# Patient Record
(demographics unavailable — no encounter records)

---

## 2017-02-17 NOTE — ER DOCUMENT REPORT
ED ENT





- General


Chief Complaint: Flu Symptoms


Stated Complaint: SORE THROAT/COUGH/FEVER


Time seen by provider: 01:49


Mode of Arrival: Ambulatory


Information source: Patient, Parent


TRAVEL OUTSIDE OF THE [place] IN LAST 30 DAYS: No





- HPI


Patient complains to provider of: Throat problem


Onset: Yesterday


Onset/Duration: Gradual


Quality of pain: Achy


Severity: Moderate


Pain Level: 3


Location of pain: Throat


Associated symptoms: Fever, Headache


Notes: 


Patient is an 11-year-old male brought to the emergency room by mother for 

complaints of fever with sore throat and runny nose 2 days, 2 episodes of 

vomiting today but that was associated with a migraine headache which patient 

frequently gets, his last dose of Motrin was at 3 PM, mother reports multiple 

sick contacts at school recently





- Related Data


Allergies/Adverse Reactions: 


 





No Known Allergies Allergy (Verified 11/03/12 18:30)


 











Past Medical History





- General


Information source: Patient, Parent





- Social History


Smoking Status: Never Smoker


Chew tobacco use (# tins/day): No


Frequency of alcohol use: None


Drug Abuse: None


Family History: Reviewed & Not Pertinent


Patient has suicidal ideation: No


Patient has homicidal ideation: No


Renal/ Medical History: Denies: Hx Peritoneal Dialysis





- Immunizations


Immunizations up to date: Yes





Review of Systems





- Review of Systems


Constitutional: Fever


EENT: See HPI


Cardiovascular: No symptoms reported


Respiratory: No symptoms reported


Gastrointestinal: See HPI


Genitourinary: No symptoms reported


Male Genitourinary: No symptoms reported


Musculoskeletal: No symptoms reported


Skin: No symptoms reported


Hematologic/Lymphatic: No symptoms reported


Neurological/Psychological: Headaches


-: Yes All other systems reviewed and negative





Physical Exam





- Vital signs


Vitals: 


 











Temp Pulse Resp BP Pulse Ox


 


 98.6 F   102 H  18   117/82   98 


 


 02/16/17 21:49  02/16/17 21:49  02/16/17 21:49  02/16/17 21:49  02/16/17 21:49











Interpretation: Tachycardic, Febrile





- General


General appearance: Appears well, Alert





- HEENT


Head: Normocephalic, Atraumatic


Eyes: Normal


Conjunctiva: Normal


Extraocular movements intact: Yes


Eyelashes: Normal


Pupils: PERRL


Ears: Normal


External canal: Normal


Tympanic membrane: Normal


Sinus: Normal


Nasal: Normal


Mouth/Lips: Normal


Pharynx: Erythema, Exudate, Tonsillar hypertrophy


Neck: Normal





- Respiratory


Respiratory status: No respiratory distress


Chest status: Nontender


Breath sounds: Normal


Chest palpation: Normal





- Cardiovascular


Rhythm: Regular


Heart sounds: Normal auscultation


Murmur: No





- Abdominal


Inspection: Normal


Distension: No distension


Bowel sounds: Normal


Tenderness: Nontender


Organomegaly: No organomegaly





- Back


Back: Normal, Nontender





- Extremities


General upper extremity: Normal inspection, Nontender, Normal color, Normal ROM

, Normal temperature


General lower extremity: Normal inspection, Nontender, Normal color, Normal ROM

, Normal temperature, Normal weight bearing.  No: Liza's sign





- Neurological


Neuro grossly intact: Yes


Cognition: Normal


Orientation: AAOx4


Lexie Coma Scale Eye Opening: Spontaneous


Lexie Coma Scale Verbal: Oriented


Lexie Coma Scale Motor: Obeys Commands


[place] Coma Scale Total: 15


Speech: Normal


Motor strength normal: LUE, RUE, LLE, RLE


Sensory: Normal





- Psychological


Associated symptoms: Normal affect, Normal mood





- Skin


Skin Temperature: Warm


Skin Moisture: Dry


Skin Color: Normal





Course





- Re-evaluation


Re-evalutation: 





02/17/17 04:04


Physical exam findings are consistent with strep pharyngitis with erythema, 

tonsillar hypertrophy and exudates, although patient's rapid strep test was 

negative, he was provided with a dose of IM Bicillin as well as antipyretics, 

mother was advised to follow-up with the pediatrician, provide supportive care, 

return if symptoms worsen, patient's mother acknowledges understanding and 

agreement with this plan





- Vital Signs


Vital signs: 


 











Temp Pulse Resp BP Pulse Ox


 


 100.3 F H  129 H  14 L  130/80   98 


 


 02/17/17 02:50  02/17/17 02:50  02/17/17 02:50  02/17/17 02:50  02/17/17 02:50














Discharge





- Discharge


Clinical Impression: 


 Strep pharyngitis





Condition: Stable


Disposition: HOME, SELF-CARE


Instructions:  Acetaminophen, Strep Throat ([facility]), Pediatric Ibuprofen ([facility])


Additional Instructions: 


Encourage plenty of fluids.  Tylenol or Motrin as needed for fever.  Follow-up 

with your pediatrician in one to 2 days.  Return to the emergency room 

immediately if symptoms worsen or any additional concerns.


Forms:  Return to School


Referrals: 


FAREED RANDLE MD [Primary Care Provider] - Follow up as needed

## 2017-12-02 NOTE — ER DOCUMENT REPORT
HPI





- HPI


Pain Level: 5


Notes: 





Patient is a 12-year-old male no significant past medical history aside from 

migraines who presents the ED with mother complaining of hitting his head off 

the ground while playing football couple hours ago.  Patient states that he was 

tackled and hit his head off the ground, but was wearing his helmet.  Patient 

did not have any loss of consciousness, nausea/vomiting.  Patient currently has 

a lingering headache and pain behind his eyes bilaterally.  Patient does not 

have any pain with movement of his eyes or swelling or redness.  He still 

eating and drinking without any difficulties.  He is urinating normally.  

Mother has not noticed any behavioral changes.  Denies any drug allergies.  

Patient is ambulatory without any difficulties.  Denies any fever, neck pain, 

changes in vision/speech/mentation/hearing, URI, sore throat, chest pain, 

palpitations, syncope, cough, shortness of breath, wheeze, dyspnea, abdominal 

pain, nausea/vomiting/diarrhea, urinary retention, dysuria, hematuria, loss of 

control of bowel or bladder, numbness/tingling, saddle anesthesia, muscle 

paralysis/weakness, or rash.





- ROS


Notes: 





REVIEW OF SYSTEMS:


CONSTITUTIONAL :  Denies fever,  chills, or sweats.  Denies recent illness.


EENT:   see hpi.  Denies eye, ear, throat, or mouth pain or symptoms.  Denies 

nasal or sinus congestion or discharge.  Denies throat, tongue, or mouth 

swelling or difficulty swallowing.  No light sensitivity or scotomas


CARDIOVASCULAR:  Denies chest pain.  Denies palpitations or racing or irregular 

heart beat.  Denies ankle edema.


RESPIRATORY:  Denies cough, cold, or chest congestion.  Denies shortness of 

breath, difficulty breathing, or wheezing.


GASTROINTESTINAL:  Denies abdominal pain or distention.  Denies nausea, vomiting

, or diarrhea.  Denies blood in vomitus, stools, or per rectum.  Denies black, 

tarry stools.  Denies constipation.  


GENITOURINARY:  Denies difficulty urinating, painful urination, burning, 

frequency, blood in urine, or discharge.


MUSCULOSKELETAL:  Denies back or neck pain or stiffness.  Denies joint pain or 

swelling.


SKIN:   Denies rash, lesions or sores.


NEUROLOGICAL:  see hpi. Denies confusion or altered mental status.  Denies 

passing out or loss of consciousness.  Denies dizziness or lightheadedness.    

Denies weakness or paralysis or loss of use of either side.  Denies problems 

with gait or speech.  Denies sensory loss, numbness, or tingling.  Denies 

seizures.


PSYCHIATRIC:  Denies anxiety or stress.  Denies depression, suicidal ideation, 

or homicidal ideation.





ALL OTHER SYSTEMS REVIEWED AND NEGATIVE.





Dictation was performed using Dragon voice recognition software 





Past Medical History





- Social History


Smoking Status: Never Smoker


Family History: Reviewed & Not Pertinent


Renal/ Medical History: Denies: Hx Peritoneal Dialysis





- Immunizations


Immunizations up to date: Yes





Vertical Provider Document





- CONSTITUTIONAL


Agree With Documented VS: Yes


Notes: 





PHYSICAL EXAMINATION:





GENERAL: Well-appearing, well-nourished and in no acute distress.  A&Ox4





HEAD: Atraumatic, normocephalic.  Non-tender.  No dodson sign





EYES: Pupils equal round and reactive to light, extraocular movements intact, 

sclera anicteric, conjunctiva are normal.  No raccoon eyes/entrapment.  Non-

tender to palp.  No surrounding erythema/cellulitis.





ENT: EAC clear b/l.  TM's intact b/l without erythema, fluid, or perforation.  

Nares patent and without discharge.  oropharynx clear without exudates.  No 

tonsilar hypertrophy or erythema.  Moist mucous membranes.  No sinus 

tenderness.  No hemotympanum/CSF discharge.





NECK: Normal range of motion, supple without lymphadenopathy.  No rigidity.  No 

midline tenderness. Spurling negative.  NEXUS negative.





Chest:  No flail chest.  equal rise/fall. Non-tender





LUNGS: Breath sounds clear to auscultation bilaterally and equal.  No wheezes 

rales or rhonchi.





HEART: Regular rate and rhythm without murmurs, rubs, gallops.





ABDOMEN: Soft, nontender, nondistended abdomen.  No guarding, no rebound.  No 

masses appreciated.  Normal bowel sounds present.  No CVA tenderness 

bilaterally.  





Musculoskeletal: Ext b/l:  FROM to passive/active. Strength 5+/5.  No deficits 

noted.  No bony tenderness of extremities.





Back:  FROM to passive/active.  Strength 5+/5.  No vertebral point tenderness, 

stepoffs, or deformities.  No other bony tenderness or ecchymosis.  SLR 

negative b/l.





Extremities:  No cyanosis, clubbing, or edema b/l.  Peripheral pulses 2+.  

Capillary refill less than 2 seconds.





NEUROLOGICAL: NIH 0.  MMSE intact.  Cranial nerves grossly intact.  Normal 

speech, normal gait.  Normal sensory, motor exams.  Reflexes 2+ b/l. EMILIA's 

negative.  Pronator drift negative. Heel/shin, finger/nose wnl. Walking on heels

/toes and heel to toe wnl.





PSYCH: Normal mood, normal affect.





SKIN: Warm, Dry, normal turgor, no rashes or lesions noted.





- INFECTION CONTROL


TRAVEL OUTSIDE OF THE [place] IN LAST 30 DAYS: No





- RESPIRATORY


O2 Sat by Pulse Oximetry: 100





Course





- Re-evaluation


Re-evalutation: 





12/02/17 19:07


Patient is an afebrile, well-hydrated, 12-year-old male who presents ED with a 

headache, suspect possible mild postconcussive syndrome.  Vitals are stable.  

PE is otherwise unremarkable  For any focal neurological deficits.  MMSE intact

, Nexus negative, PECARN neg, NIH 0.  Low suspicion for any acute glaucoma, 

temporal arteritis, meningitis, intracranial hemorrhage, ischemic stroke, or 

fracture at this time.  Patient/mother are aware that his condition can change 

from initial presentation and that they need to monitor symptoms closely for 

any acute changes.  I did offer Motrin/Tylenol which patient declined.  

Recommend conservative measures for symptoms with adequate brain rest until 

eval with PCM.  Recheck with your pediatrician in 2-3 days. Return to the ED 

with any worsening/concerning symptoms otherwise as reviewed in discharge.  

Mother and patient are in agreement.





- Vital Signs


Vital signs: 


 











Temp Pulse Resp BP Pulse Ox


 


 98.1 F   90   20   120/75   100 


 


 12/02/17 17:13  12/02/17 17:13  12/02/17 17:13  12/02/17 17:13  12/02/17 17:13














Discharge





- Discharge


Clinical Impression: 


 Postconcussive syndrome





Headache


Qualifiers:


 Headache type: unspecified Headache chronicity pattern: acute headache 

Intractability: not intractable Qualified Code(s): R51 - Headache





Condition: Stable


Disposition: HOME, SELF-CARE


Instructions:  Post-Concussion Syndrome ([facility]), Headache ([facility])


Additional Instructions: 


Rest, Ice


Brain rest until evaluation with PCM as reviewed


Tylenol/ibuprofen as needed


Light stretches daily


Strength exercises as able


Moist heat and massage may help


F/u with your PCP in 2-3 days for a recheck





Return to the ED with any worsening symptoms and/or development of fever, 

worsening headache, changes in behavior/mentation/speech/vision, chest pain, 

palpitations, syncope, shortness of breath, trouble breathing, abdominal pain, n

/v/d, blood in stool/urine, loss of control of bowel/bladder, urinary retention

, muscle weakness/paralysis, saddle anesthesia, numbness/tingling, or other 

worsening symptoms that are concerning to you.


Referrals: 


FAREED RANDLE MD [Primary Care Provider] - 12/04/17